# Patient Record
Sex: FEMALE | Race: WHITE | NOT HISPANIC OR LATINO | URBAN - METROPOLITAN AREA
[De-identification: names, ages, dates, MRNs, and addresses within clinical notes are randomized per-mention and may not be internally consistent; named-entity substitution may affect disease eponyms.]

---

## 2023-09-27 ENCOUNTER — EMERGENCY (EMERGENCY)
Facility: HOSPITAL | Age: 33
LOS: 0 days | Discharge: HOME | End: 2023-09-28
Attending: EMERGENCY MEDICINE
Payer: SELF-PAY

## 2023-09-27 VITALS
RESPIRATION RATE: 16 BRPM | HEART RATE: 77 BPM | OXYGEN SATURATION: 96 % | TEMPERATURE: 98 F | DIASTOLIC BLOOD PRESSURE: 73 MMHG | SYSTOLIC BLOOD PRESSURE: 83 MMHG

## 2023-09-27 DIAGNOSIS — R03.1 NONSPECIFIC LOW BLOOD-PRESSURE READING: ICD-10-CM

## 2023-09-27 DIAGNOSIS — F41.9 ANXIETY DISORDER, UNSPECIFIED: ICD-10-CM

## 2023-09-27 PROCEDURE — 99284 EMERGENCY DEPT VISIT MOD MDM: CPT

## 2023-09-27 PROCEDURE — 99283 EMERGENCY DEPT VISIT LOW MDM: CPT

## 2023-09-27 RX ORDER — NICOTINE POLACRILEX 2 MG
1 GUM BUCCAL ONCE
Refills: 0 | Status: COMPLETED | OUTPATIENT
Start: 2023-09-27 | End: 2023-09-27

## 2023-09-27 RX ORDER — ACETAMINOPHEN 500 MG
650 TABLET ORAL ONCE
Refills: 0 | Status: COMPLETED | OUTPATIENT
Start: 2023-09-27 | End: 2023-09-27

## 2023-09-27 RX ORDER — IBUPROFEN 200 MG
600 TABLET ORAL ONCE
Refills: 0 | Status: COMPLETED | OUTPATIENT
Start: 2023-09-27 | End: 2023-09-27

## 2023-09-27 RX ORDER — ALPRAZOLAM 0.25 MG
0.5 TABLET ORAL ONCE
Refills: 0 | Status: DISCONTINUED | OUTPATIENT
Start: 2023-09-27 | End: 2023-09-27

## 2023-09-27 RX ADMIN — Medication 0.5 MILLIGRAM(S): at 18:04

## 2023-09-27 RX ADMIN — Medication 650 MILLIGRAM(S): at 21:05

## 2023-09-27 RX ADMIN — Medication 600 MILLIGRAM(S): at 21:05

## 2023-09-27 NOTE — ED ADULT NURSE NOTE - NS ED NURSE LEVEL OF CONSCIOUSNESS MENTAL STATUS
Pt  Ambulated out of dept , vss, normal gait, no acute distress       Miguelangel Moore RN  04/15/18 0000 Awake/Alert

## 2023-09-27 NOTE — ED PROVIDER NOTE - OBJECTIVE STATEMENT
32 y/o female with no pertinent PMHx BIBEMS to the ED c/o anxiety. Pt reports she is from [place] and had an "anxiety attack" today because she was dismissed from the hotel and has nowhere to stay, requesting housing assistance. Pt otherwise has no other complaints. 32 y/o female with no pertinent PMHx BIBEMS to the ED c/o anxiety. Pt reports she is from [place] and had an "anxiety attack" today because her case was dismissed by DSS and has nowhere to stay, requesting housing assistance. Pt otherwise has no other complaints, denies HI/SI.

## 2023-09-27 NOTE — ED ADULT TRIAGE NOTE - CHIEF COMPLAINT QUOTE
Pt BIBA for anxiety. As per EMS pt was staying in [facility] when she called EMS for anxiety. Pt treated by MD Evans not on any meds at this time. pt denies SI or HI. Pt BIBA for anxiety. As per EMS pt was staying in [facility] when she called EMS for anxiety.  Pt treated by MD Evans not on any meds at this time. As per pt she has nowhere to stay. pt denies SI or HI.

## 2023-09-27 NOTE — ED ADULT NURSE NOTE - CHIEF COMPLAINT QUOTE
Pt BIBA for anxiety. As per EMS pt was staying in [facility] when she called EMS for anxiety.  Pt treated by MD Evans not on any meds at this time. As per pt she has nowhere to stay. pt denies SI or HI.

## 2023-09-27 NOTE — ED PROVIDER NOTE - PHYSICAL EXAMINATION
Gen: Well appearing in NAD  Head: NC/AT  Neck: Trachea midline  Resp: No distress  Ext: No deformities  Neuro: A&O appears non focal  Skin: Warm and dry as visualized  Psych: Normal affect and mood

## 2023-09-27 NOTE — ED ADULT NURSE NOTE - NSFALLUNIVINTERV_ED_ALL_ED
Bed/Stretcher in lowest position, wheels locked, appropriate side rails in place/Call bell, personal items and telephone in reach/Instruct patient to call for assistance before getting out of bed/chair/stretcher/Non-slip footwear applied when patient is off stretcher/[place] to call system/Physically safe environment - no spills, clutter or unnecessary equipment/Purposeful proactive rounding/Room/bathroom lighting operational, light cord in reach

## 2023-09-27 NOTE — ED PROVIDER NOTE - PATIENT PORTAL LINK FT
You can access the FollowMyHealth Patient Portal offered by [facility] by registering at the following website: [email or web address]. By joining [facility]’s FollowMyHealth portal, you will also be able to view your health information using other applications (apps) compatible with our system.

## 2023-09-27 NOTE — ED ADULT NURSE NOTE - OBJECTIVE STATEMENT
c/o panic attack after faxing housing paper late and receiving housing paper late at [facility]. Patient has no place to stay at the moment except in [place]. Denies CP, SOB at this time

## 2023-09-27 NOTE — ED ADULT NURSE REASSESSMENT NOTE - NS ED NURSE REASSESS COMMENT FT1
Pt report received from previous RN. Pt contact made. Pt has no new complaints at this time. Pt is A&Ox4 and speaking in full sentences. Pt is resting comfortably in bed, safety precautions maintained. Awaiting social work consult in AM  at this time.

## 2023-09-27 NOTE — ED PROVIDER NOTE - CLINICAL SUMMARY MEDICAL DECISION MAKING FREE TEXT BOX
Patient here looking for housing, reports her case was recently discharged from [facility], patient thinks he had an anxiety attack because she has nowhere to go, patient calm and cooperative, no SI or HI, social work not available at this time, discussed with case management, given [facility] emergency hours number, will hold overnight for social work if [facility] unable to place. Patient here looking for housing, reports her case was recently discharged from [facility], patient thinks he had an anxiety attack because she has nowhere to go, patient calm and cooperative, no SI or HI, social work not available at this time, discussed with case management, given [facility] emergency hours number, will hold overnight for social work if [facility] unable to place.      Shane SINGH: received s/o from Dr. Alexander- BP stable at this time; patient was mildly hypotensive after bzs in ED; no other complaints; ambulatory in ED with steady gait; requesting xanax before discharge and instructed to f/u with outpatient physicians; seen by SW- to go to [facility] at this time; strict return precautions given.

## 2023-09-28 VITALS — DIASTOLIC BLOOD PRESSURE: 72 MMHG | SYSTOLIC BLOOD PRESSURE: 100 MMHG

## 2023-09-28 RX ADMIN — Medication 1 PATCH: at 00:10

## 2023-09-28 NOTE — CHART NOTE - NSCHARTNOTEFT_GEN_A_CORE
Chart reviewed. Met with patient last p.m and this am. She recently came to [place] from [place] and is homeless. She states that she has an open case with [facility] and her luggage is at one of the [facility]. She called the emergency housing line last pm but did not receive a placement. I consulted Francine Geronimo, supervisor of Social work. She instructed me to give patient a taxi voucher to [facility] facility in [place] so the patient can straighten out her case. Comfort provided. Patient pleased with plan to go to [facility] to get services.
